# Patient Record
Sex: FEMALE | Race: BLACK OR AFRICAN AMERICAN | URBAN - METROPOLITAN AREA
[De-identification: names, ages, dates, MRNs, and addresses within clinical notes are randomized per-mention and may not be internally consistent; named-entity substitution may affect disease eponyms.]

---

## 2020-06-08 ENCOUNTER — CLAIMS CREATED FROM THE CLAIM WINDOW (OUTPATIENT)
Dept: URBAN - METROPOLITAN AREA CLINIC 4 | Facility: CLINIC | Age: 75
End: 2020-06-08
Payer: COMMERCIAL

## 2020-06-08 ENCOUNTER — OFFICE VISIT (OUTPATIENT)
Dept: URBAN - METROPOLITAN AREA SURGERY CENTER 16 | Facility: SURGERY CENTER | Age: 75
End: 2020-06-08
Payer: COMMERCIAL

## 2020-06-08 DIAGNOSIS — R11.2 NAUSEA WITH VOMITING, UNSPECIFIED: ICD-10-CM

## 2020-06-08 DIAGNOSIS — D50.9 IRON DEFICIENCY ANEMIA, UNSPECIFIED: ICD-10-CM

## 2020-06-08 DIAGNOSIS — R10.9 UNSPECIFIED ABDOMINAL PAIN: ICD-10-CM

## 2020-06-08 DIAGNOSIS — K21.9 ACID REFLUX: ICD-10-CM

## 2020-06-08 DIAGNOSIS — K22.2 ACQUIRED ESOPHAGEAL RING: ICD-10-CM

## 2020-06-08 DIAGNOSIS — K21.0 GASTRO-ESOPHAGEAL REFLUX DISEASE WITH ESOPHAGITIS: ICD-10-CM

## 2020-06-08 DIAGNOSIS — K31.89 ACQUIRED DEFORMITY OF PYLORUS: ICD-10-CM

## 2020-06-08 PROCEDURE — 43239 EGD BIOPSY SINGLE/MULTIPLE: CPT | Performed by: INTERNAL MEDICINE

## 2020-06-08 PROCEDURE — G8907 PT DOC NO EVENTS ON DISCHARG: HCPCS | Performed by: INTERNAL MEDICINE

## 2020-06-08 PROCEDURE — 88312 SPECIAL STAINS GROUP 1: CPT | Performed by: PATHOLOGY

## 2020-06-08 PROCEDURE — 88305 TISSUE EXAM BY PATHOLOGIST: CPT | Performed by: PATHOLOGY

## 2020-06-23 ENCOUNTER — LAB OUTSIDE AN ENCOUNTER (OUTPATIENT)
Dept: URBAN - METROPOLITAN AREA TELEHEALTH 2 | Facility: TELEHEALTH | Age: 75
End: 2020-06-23

## 2020-06-23 ENCOUNTER — OFFICE VISIT (OUTPATIENT)
Dept: URBAN - METROPOLITAN AREA TELEHEALTH 2 | Facility: TELEHEALTH | Age: 75
End: 2020-06-23
Payer: COMMERCIAL

## 2020-06-23 DIAGNOSIS — K21.9 GERD: ICD-10-CM

## 2020-06-23 DIAGNOSIS — Z86.010 PERSONAL HISTORY OF COLON POLYPS: ICD-10-CM

## 2020-06-23 DIAGNOSIS — R10.13 EPIGASTRIC ABDOMINAL PAIN: ICD-10-CM

## 2020-06-23 DIAGNOSIS — R10.12 LUQ ABDOMINAL PAIN: ICD-10-CM

## 2020-06-23 PROCEDURE — G9903 PT SCRN TBCO ID AS NON USER: HCPCS | Performed by: INTERNAL MEDICINE

## 2020-06-23 PROCEDURE — 1036F TOBACCO NON-USER: CPT | Performed by: INTERNAL MEDICINE

## 2020-06-23 PROCEDURE — 99442 PHONE E/M BY PHYS 11-20 MIN: CPT | Performed by: INTERNAL MEDICINE

## 2020-06-23 RX ORDER — ESOMEPRAZOLE MAGNESIUM 20 MG/1
1 CAPSULE CAPSULE, DELAYED RELEASE ORAL ONCE A DAY
Status: ACTIVE | COMMUNITY

## 2020-06-23 RX ORDER — ESOMEPRAZOLE MAGNESIUM 40 MG/1
TAKE ONE CAPSULE BY MOUTH CAPSULE, DELAYED RELEASE ORAL
Refills: 1 | OUTPATIENT
Start: 2020-06-23

## 2020-06-23 RX ORDER — CARBOXYMETHYLCELLULOSE SODIUM 5 MG/ML
INSTILL 1 DROP IN AFFECTED EYE SOLUTION/ DROPS OPHTHALMIC
Qty: 1 | Refills: 0 | Status: ACTIVE | COMMUNITY
Start: 1900-01-01

## 2020-06-23 RX ORDER — LEVALBUTEROL TARTRATE 45 UG/1
INHALE 1 PUFF (45 MCG) BY INHALATION ROUTE EVERY 4 HOURS AEROSOL, METERED ORAL
Qty: 1 | Refills: 0 | Status: ACTIVE | COMMUNITY
Start: 1900-01-01

## 2020-06-23 RX ORDER — DEXTROSE 4 G
TAKE 1 TABLET BY ORAL ROUTE 2 TIMES A DAY TABLET,CHEWABLE ORAL 2
Qty: 0 | Refills: 0 | Status: ACTIVE | COMMUNITY
Start: 1900-01-01

## 2020-06-23 RX ORDER — TRAVOPROST 0.04 MG/ML
INSTILL 1 DROP INTO LEFT EYE BY OPHTHALMIC ROUTE ONCE DAILY IN THE EVENING SOLUTION/ DROPS OPHTHALMIC 1
Qty: 1 | Refills: 0 | Status: ACTIVE | COMMUNITY
Start: 1900-01-01

## 2020-06-23 RX ORDER — FAMOTIDINE 20 MG/1
TAKE 0.5 TABLET BY ORAL ROUTE 2 TIMES A DAY TABLET ORAL 2
Qty: 0 | Refills: 0 | Status: DISCONTINUED | COMMUNITY
Start: 1900-01-01

## 2020-06-23 NOTE — HPI-OTHER HISTORIES
The patient is an /Black female who presents in follow-up for epigastric abdominal pain and bloating/gas.  On 3/10/20, the patient presented for epigastric abdominal pain and bloating/gas. Onset was over a month ago. She was referred here by a gynecologist after presenting for vaginal bleeding. She reported a history of diverticulitis and duodenal ulcers. She had been treated twice for H. pylori she stated. Afterwards, she was told to never take muscle relaxers. Her PCP was Sydney Maya at [facility]. She had a colon in late 2019 at [place]. Another one was done 3-4 years prior to that. Polyps were found. She had an EGD several years ago. There were no ulcers, she said. She was previously on Nexium, then ranitidine. She had been taking famotidine 20 mg 1?2 pill BID for 3 months. She hadn't taken Nexium in 2 1?2 years. She previously had heartburn, which resolved. She noted a benefit on Nexium. She was unsure if her pain correlated with a high-fat meal because she didn't often eat high-fat meals.  Today, she says she is taking Nexium OTC BID because she was told that was cheaper than the prescription.  She states she can only take the brand Nexium and not the generic - she is "sensitive" to alot of meds she states.  She still reports heartburn and indigestion. She notes epigastric and LUQ pain associated with bloating/excessive gas.  She notes she has been told previously to cut our carbonated drinks and not to drink through a straw. She doesn't drink coffee, soda, or alcohol. She eats dinner at 5-6 pm and sleeps at 9 with occasional snacking in between.  Labs 3/10/20 - CMP normal except creatinine 1.05. CBC, lipase all normal.

## 2020-07-21 ENCOUNTER — OFFICE VISIT (OUTPATIENT)
Dept: URBAN - METROPOLITAN AREA CLINIC 105 | Facility: CLINIC | Age: 75
End: 2020-07-21
Payer: COMMERCIAL

## 2020-07-21 DIAGNOSIS — Z86.010 PERSONAL HISTORY OF COLONIC POLYPS: ICD-10-CM

## 2020-07-21 DIAGNOSIS — R10.12 LUQ ABDOMINAL PAIN: ICD-10-CM

## 2020-07-21 DIAGNOSIS — R10.13 EPIGASTRIC ABDOMINAL PAIN: ICD-10-CM

## 2020-07-21 DIAGNOSIS — K21.9 GERD: ICD-10-CM

## 2020-07-21 PROCEDURE — G8427 DOCREV CUR MEDS BY ELIG CLIN: HCPCS | Performed by: INTERNAL MEDICINE

## 2020-07-21 PROCEDURE — 74250 X-RAY XM SM INT 1CNTRST STD: CPT | Performed by: INTERNAL MEDICINE

## 2020-07-21 PROCEDURE — G9903 PT SCRN TBCO ID AS NON USER: HCPCS | Performed by: INTERNAL MEDICINE

## 2020-07-21 PROCEDURE — 99214 OFFICE O/P EST MOD 30 MIN: CPT | Performed by: INTERNAL MEDICINE

## 2020-07-21 PROCEDURE — 1036F TOBACCO NON-USER: CPT | Performed by: INTERNAL MEDICINE

## 2020-07-21 PROCEDURE — G8420 CALC BMI NORM PARAMETERS: HCPCS | Performed by: INTERNAL MEDICINE

## 2020-07-21 RX ORDER — ESOMEPRAZOLE MAGNESIUM 40 MG/1
TAKE ONE CAPSULE BY MOUTH CAPSULE, DELAYED RELEASE ORAL
Refills: 1 | Status: DISCONTINUED | COMMUNITY
Start: 2020-06-23

## 2020-07-21 RX ORDER — ESOMEPRAZOLE MAGNESIUM 20 MG/1
1 CAPSULE CAPSULE, DELAYED RELEASE ORAL ONCE A DAY
Status: ACTIVE | COMMUNITY

## 2020-07-21 RX ORDER — BUDESONIDE AND FORMOTEROL FUMARATE DIHYDRATE 160; 4.5 UG/1; UG/1
2 PUFFS AEROSOL RESPIRATORY (INHALATION) TWICE A DAY
Status: ACTIVE | COMMUNITY

## 2020-07-21 RX ORDER — CARBOXYMETHYLCELLULOSE SODIUM 5 MG/ML
INSTILL 1 DROP IN AFFECTED EYE SOLUTION/ DROPS OPHTHALMIC
Qty: 1 | Refills: 0 | Status: ACTIVE | COMMUNITY
Start: 1900-01-01

## 2020-07-21 RX ORDER — TRAVOPROST 0.04 MG/ML
INSTILL 1 DROP INTO LEFT EYE BY OPHTHALMIC ROUTE ONCE DAILY IN THE EVENING SOLUTION/ DROPS OPHTHALMIC 1
Qty: 1 | Refills: 0 | Status: ACTIVE | COMMUNITY
Start: 1900-01-01

## 2020-07-21 RX ORDER — LEVALBUTEROL TARTRATE 45 UG/1
INHALE 1 PUFF (45 MCG) BY INHALATION ROUTE EVERY 4 HOURS AEROSOL, METERED ORAL
Qty: 1 | Refills: 0 | Status: ACTIVE | COMMUNITY
Start: 1900-01-01

## 2020-07-21 RX ORDER — DEXTROSE 4 G
TAKE 1 TABLET BY ORAL ROUTE 2 TIMES A DAY TABLET,CHEWABLE ORAL 2
Qty: 0 | Refills: 0 | Status: ACTIVE | COMMUNITY
Start: 1900-01-01

## 2020-07-21 NOTE — HPI-OTHER HISTORIES
The patient is an /Black female who presents in follow-up for epigastric abdominal pain and bloating/gas.  On 3/10/20, the patient presented for epigastric abdominal pain and bloating/gas. Onset was over a month ago. She was referred here by a gynecologist after presenting for vaginal bleeding. She reported a history of diverticulitis and duodenal ulcers. She had been treated twice for H. pylori she stated. Afterwards, she was told to never take muscle relaxers. Her PCP was Sydney Maya at [facility]. She had a colon in late 2019 at [place]. Another one was done 3-4 years prior to that. Polyps were found. She had an EGD several years ago. There were no ulcers, she said. She was previously on Nexium, then ranitidine. She had been taking famotidine 20 mg 1?2 pill BID for 3 months. She hadn't taken Nexium in 2 1?2 years. She previously had heartburn, which resolved. She noted a benefit on Nexium. She was unsure if her pain correlated with a high-fat meal because she didn't often eat high-fat meals.  On 6/23/20, she said she was taking Nexium OTC BID because she was told that was cheaper than the prescription.  She stated she could only take the brand Nexium and not the generic - she was "sensitive" to alot of meds she stated.  She still reported heartburn and indigestion. She noted epigastric and LUQ pain associated with bloating/excessive gas.  She noted she had been told previously to cut our carbonated drinks and not to drink through a straw. She didn't drink coffee, soda, or alcohol. She ate dinner at 5-6 pm and sleeps at 9 with occasional snacking in between.  Today, she notes a "big knot" in her chest feeling food does not go down with epigastric/LUQ discomfort.  She never increased to Nexium OTC 2 pills BID. She is currently on 1 pill BID.   Labs 3/10/20 - CMP normal except creatinine 1.05. CBC, lipase all normal.

## 2020-08-20 ENCOUNTER — OFFICE VISIT (OUTPATIENT)
Dept: URBAN - METROPOLITAN AREA CLINIC 105 | Facility: CLINIC | Age: 75
End: 2020-08-20
Payer: COMMERCIAL

## 2020-08-20 DIAGNOSIS — Z86.010 H/O ADENOMATOUS POLYP OF COLON: ICD-10-CM

## 2020-08-20 DIAGNOSIS — R10.13 EPIGASTRIC ABDOMINAL PAIN: ICD-10-CM

## 2020-08-20 DIAGNOSIS — R10.12 LUQ ABDOMINAL PAIN: ICD-10-CM

## 2020-08-20 DIAGNOSIS — K21.9 GERD: ICD-10-CM

## 2020-08-20 PROCEDURE — G8427 DOCREV CUR MEDS BY ELIG CLIN: HCPCS | Performed by: INTERNAL MEDICINE

## 2020-08-20 PROCEDURE — 1036F TOBACCO NON-USER: CPT | Performed by: INTERNAL MEDICINE

## 2020-08-20 PROCEDURE — G9903 PT SCRN TBCO ID AS NON USER: HCPCS | Performed by: INTERNAL MEDICINE

## 2020-08-20 PROCEDURE — 99214 OFFICE O/P EST MOD 30 MIN: CPT | Performed by: INTERNAL MEDICINE

## 2020-08-20 PROCEDURE — G8417 CALC BMI ABV UP PARAM F/U: HCPCS | Performed by: INTERNAL MEDICINE

## 2020-08-20 RX ORDER — BUDESONIDE AND FORMOTEROL FUMARATE DIHYDRATE 160; 4.5 UG/1; UG/1
2 PUFFS AEROSOL RESPIRATORY (INHALATION) TWICE A DAY
Status: ACTIVE | COMMUNITY

## 2020-08-20 RX ORDER — DEXTROSE 4 G
TAKE 1 TABLET BY ORAL ROUTE 2 TIMES A DAY TABLET,CHEWABLE ORAL 2
Qty: 0 | Refills: 0 | Status: ACTIVE | COMMUNITY
Start: 1900-01-01

## 2020-08-20 RX ORDER — ESOMEPRAZOLE MAGNESIUM 20 MG/1
1 CAPSULE CAPSULE, DELAYED RELEASE ORAL ONCE A DAY
Status: ACTIVE | COMMUNITY

## 2020-08-20 RX ORDER — TRAVOPROST 0.04 MG/ML
INSTILL 1 DROP INTO LEFT EYE BY OPHTHALMIC ROUTE ONCE DAILY IN THE EVENING SOLUTION/ DROPS OPHTHALMIC 1
Qty: 1 | Refills: 0 | Status: ACTIVE | COMMUNITY
Start: 1900-01-01

## 2020-08-20 RX ORDER — LEVALBUTEROL TARTRATE 45 UG/1
INHALE 1 PUFF (45 MCG) BY INHALATION ROUTE EVERY 4 HOURS AEROSOL, METERED ORAL
Qty: 1 | Refills: 0 | Status: DISCONTINUED | COMMUNITY
Start: 1900-01-01

## 2020-08-20 RX ORDER — CARBOXYMETHYLCELLULOSE SODIUM 5 MG/ML
INSTILL 1 DROP IN AFFECTED EYE SOLUTION/ DROPS OPHTHALMIC
Qty: 1 | Refills: 0 | Status: ACTIVE | COMMUNITY
Start: 1900-01-01

## 2020-08-20 NOTE — HPI-OTHER HISTORIES
The patient is an /Black female who presents in follow-up for epigastric abdominal pain and bloating/gas.  On 3/10/20, the patient presented for epigastric abdominal pain and bloating/gas. Onset was over a month ago. She was referred here by a gynecologist after presenting for vaginal bleeding. She reported a history of diverticulitis and duodenal ulcers. She had been treated twice for H. pylori she stated. Afterwards, she was told to never take muscle relaxers. Her PCP was Sydney Maya at [facility]. She had a colon in late 2019 at [place]. Another one was done 3-4 years prior to that. Polyps were found. She had an EGD several years ago. There were no ulcers, she said. She was previously on Nexium, then ranitidine. She had been taking famotidine 20 mg 1/2 pill BID for 3 months. She hadn't taken Nexium in 2 1/2 years. She previously had heartburn, which resolved. She noted a benefit on Nexium. She was unsure if her pain correlated with a high-fat meal because she didn't often eat high-fat meals.  On 6/23/20, she said she was taking Nexium OTC BID because she was told that was cheaper than the prescription.  She stated she could only take the brand Nexium and not the generic - she was "sensitive" to alot of meds she stated.  She still reported heartburn and indigestion. She noted epigastric and LUQ pain associated with bloating/excessive gas.  She noted she had been told previously to cut our carbonated drinks and not to drink through a straw. She didn't drink coffee, soda, or alcohol. She ate dinner at 5-6 pm and sleeps at 9 with occasional snacking in between.  On 7/21/20, she noted a "big knot" in her chest feeling food did not go down with epigastric/LUQ discomfort. She never increased to Nexium OTC 2 pills BID. She was currently on 1 pill BID.  Today, she says she continues on Nexium OTC 2 pills BID. She notes some improvement. She also started on Gas-X and Fiber Choice. She never proceeded with an esophagram because she states she had one previously.  Labs 3/10/20 - CMP normal except creatinine 1.05. CBC, lipase all normal.

## 2020-08-21 LAB
A/G RATIO: 1.3
ALBUMIN: 4.3
ALKALINE PHOSPHATASE: 93
ALT (SGPT): 13
AST (SGOT): 20
BASO (ABSOLUTE): 0
BASOS: 1
BILIRUBIN, TOTAL: <0.2
BUN/CREATININE RATIO: 10
BUN: 9
CALCIUM: 9.9
CARBON DIOXIDE, TOTAL: 21
CHLORIDE: 104
CREATININE: 0.94
EGFR IF AFRICN AM: 68
EGFR IF NONAFRICN AM: 59
EOS (ABSOLUTE): 0.2
EOS: 3
GLOBULIN, TOTAL: 3.3
GLUCOSE: 73
HEMATOCRIT: 37.2
HEMATOLOGY COMMENTS:: (no result)
HEMOGLOBIN: 11.6
IMMATURE CELLS: (no result)
IMMATURE GRANS (ABS): 0
IMMATURE GRANULOCYTES: 1
LYMPHS (ABSOLUTE): 1.5
LYMPHS: 25
MCH: 27.3
MCHC: 31.2
MCV: 88
MONOCYTES(ABSOLUTE): 0.6
MONOCYTES: 9
NEUTROPHILS (ABSOLUTE): 3.8
NEUTROPHILS: 61
NRBC: (no result)
PLATELETS: 235
POTASSIUM: 4.1
PROTEIN, TOTAL: 7.6
RBC: 4.25
RDW: 13.3
SODIUM: 143
WBC: 6.1

## 2020-10-13 ENCOUNTER — OFFICE VISIT (OUTPATIENT)
Dept: URBAN - METROPOLITAN AREA CLINIC 105 | Facility: CLINIC | Age: 75
End: 2020-10-13
Payer: COMMERCIAL

## 2020-10-13 ENCOUNTER — DASHBOARD ENCOUNTERS (OUTPATIENT)
Age: 75
End: 2020-10-13

## 2020-10-13 DIAGNOSIS — Z87.19 HISTORY OF DUODENAL ULCER: ICD-10-CM

## 2020-10-13 DIAGNOSIS — Z86.19 HISTORY OF HELICOBACTER PYLORI INFECTION: ICD-10-CM

## 2020-10-13 DIAGNOSIS — K21.9 GERD: ICD-10-CM

## 2020-10-13 DIAGNOSIS — K63.5 COLON POLYPS: ICD-10-CM

## 2020-10-13 DIAGNOSIS — R10.13 EPIGASTRIC ABDOMINAL PAIN: ICD-10-CM

## 2020-10-13 DIAGNOSIS — R14.0 BLOATING: ICD-10-CM

## 2020-10-13 DIAGNOSIS — R10.12 LUQ ABDOMINAL PAIN: ICD-10-CM

## 2020-10-13 PROBLEM — 197125005: Status: ACTIVE | Noted: 2020-10-13

## 2020-10-13 PROCEDURE — G8483 FLU IMM NO ADMIN DOC REA: HCPCS | Performed by: INTERNAL MEDICINE

## 2020-10-13 PROCEDURE — G8427 DOCREV CUR MEDS BY ELIG CLIN: HCPCS | Performed by: INTERNAL MEDICINE

## 2020-10-13 PROCEDURE — G8417 CALC BMI ABV UP PARAM F/U: HCPCS | Performed by: INTERNAL MEDICINE

## 2020-10-13 PROCEDURE — 99214 OFFICE O/P EST MOD 30 MIN: CPT | Performed by: INTERNAL MEDICINE

## 2020-10-13 PROCEDURE — G9903 PT SCRN TBCO ID AS NON USER: HCPCS | Performed by: INTERNAL MEDICINE

## 2020-10-13 RX ORDER — RIFAXIMIN 550 MG/1
1 TABLET TABLET ORAL THREE TIMES A DAY
Qty: 42 TABLET | Refills: 0 | OUTPATIENT
Start: 2020-10-13 | End: 2020-10-27

## 2020-10-13 RX ORDER — CARBOXYMETHYLCELLULOSE SODIUM 5 MG/ML
INSTILL 1 DROP IN AFFECTED EYE SOLUTION/ DROPS OPHTHALMIC
Qty: 1 | Refills: 0 | Status: ACTIVE | COMMUNITY
Start: 1900-01-01

## 2020-10-13 RX ORDER — BUDESONIDE AND FORMOTEROL FUMARATE DIHYDRATE 160; 4.5 UG/1; UG/1
2 PUFFS AEROSOL RESPIRATORY (INHALATION) TWICE A DAY
Status: ACTIVE | COMMUNITY

## 2020-10-13 RX ORDER — DEXTROSE 4 G
TAKE 1 TABLET BY ORAL ROUTE 2 TIMES A DAY TABLET,CHEWABLE ORAL 2
Qty: 0 | Refills: 0 | Status: ACTIVE | COMMUNITY
Start: 1900-01-01

## 2020-10-13 RX ORDER — ESOMEPRAZOLE MAGNESIUM 20 MG/1
1 CAPSULE CAPSULE, DELAYED RELEASE ORAL ONCE A DAY
Status: ACTIVE | COMMUNITY

## 2020-10-13 RX ORDER — TRAVOPROST 0.04 MG/ML
INSTILL 1 DROP INTO LEFT EYE BY OPHTHALMIC ROUTE ONCE DAILY IN THE EVENING SOLUTION/ DROPS OPHTHALMIC 1
Qty: 1 | Refills: 0 | Status: ACTIVE | COMMUNITY
Start: 1900-01-01

## 2020-10-13 NOTE — HPI-OTHER HISTORIES
The patient is an /Black female who presents in follow-up for epigastric abdominal pain and bloating/gas.  On 3/10/20, the patient presented for epigastric abdominal pain and bloating/gas. Onset was over a month ago. She was referred here by a gynecologist after presenting for vaginal bleeding. She reported a history of diverticulitis and duodenal ulcers. She had been treated twice for H. pylori she stated. Afterwards, she was told to never take muscle relaxers. Her PCP was Sydney Maya at [facility]. She had a colon in late 2019 at [place]. Another one was done 3-4 years prior to that. Polyps were found. She had an EGD several years ago. There were no ulcers, she said. She was previously on Nexium, then ranitidine. She had been taking famotidine 20 mg 1/2 pill BID for 3 months. She hadn't taken Nexium in 2 1/2 years. She previously had heartburn, which resolved. She noted a benefit on Nexium. She was unsure if her pain correlated with a high-fat meal because she didn't often eat high-fat meals.  On 6/23/20, she said she was taking Nexium OTC BID because she was told that was cheaper than the prescription.  She stated she could only take the brand Nexium and not the generic - she was "sensitive" to alot of meds she stated.  She still reported heartburn and indigestion. She noted epigastric and LUQ pain associated with bloating/excessive gas.  She noted she had been told previously to cut our carbonated drinks and not to drink through a straw. She didn't drink coffee, soda, or alcohol. She ate dinner at 5-6 pm and sleeps at 9 with occasional snacking in between.  On 7/21/20, she noted a "big knot" in her chest feeling food did not go down with epigastric/LUQ discomfort. She never increased to Nexium OTC 2 pills BID. She was currently on 1 pill BID.  On 8/20/20, she said she continued on Nexium OTC 2 pills BID. She noted some improvement. She also started on Gas-X and Fiber Choice. She never proceeded with an esophagram because she stated she had one previously.  Today, she notes taking Nexium OTC 2 pills BID and Gas-X.  She notes abdominal distention during the day from gas.  She takes Gas-X and passes alot of gas with less distention, but distention is recurrent later in the day.  She notes eating a high fiber diet and uses Fiber Choice pills.  Labs 8/20/20 - CBC, CMP all normal. 3/10/20 - CMP normal except creatinine 1.05. CBC, lipase all normal.